# Patient Record
Sex: FEMALE | Race: WHITE | URBAN - METROPOLITAN AREA
[De-identification: names, ages, dates, MRNs, and addresses within clinical notes are randomized per-mention and may not be internally consistent; named-entity substitution may affect disease eponyms.]

---

## 2022-03-25 ENCOUNTER — TELEPHONE (OUTPATIENT)
Dept: ORTHOPEDICS CLINIC | Facility: CLINIC | Age: 27
End: 2022-03-25

## 2022-03-25 NOTE — TELEPHONE ENCOUNTER
Patient has an appointment scheduled with Dr. Duane Cuevas on 4/12/22 for Big toenail on right foot damaged from fall 3 years ago. No longer growing out, just up. Please advise if imaging is needed.

## 2022-04-12 ENCOUNTER — OFFICE VISIT (OUTPATIENT)
Dept: ORTHOPEDICS CLINIC | Facility: CLINIC | Age: 27
End: 2022-04-12
Payer: COMMERCIAL

## 2022-04-12 VITALS — BODY MASS INDEX: 22.22 KG/M2 | HEIGHT: 69 IN | WEIGHT: 150 LBS

## 2022-04-12 DIAGNOSIS — B35.1 ONYCHOMYCOSIS: Primary | ICD-10-CM

## 2022-04-12 PROCEDURE — 3008F BODY MASS INDEX DOCD: CPT | Performed by: PODIATRIST

## 2022-04-12 PROCEDURE — 99202 OFFICE O/P NEW SF 15 MIN: CPT | Performed by: PODIATRIST

## 2022-04-12 RX ORDER — NORETHINDRONE ACETATE AND ETHINYL ESTRADIOL 1MG-20(21)
1 KIT ORAL DAILY
COMMUNITY
Start: 2021-09-29

## 2022-04-29 ENCOUNTER — OFFICE VISIT (OUTPATIENT)
Dept: ORTHOPEDICS CLINIC | Facility: CLINIC | Age: 27
End: 2022-04-29
Payer: COMMERCIAL

## 2022-04-29 VITALS — WEIGHT: 150 LBS | HEIGHT: 69 IN | BODY MASS INDEX: 22.22 KG/M2

## 2022-04-29 DIAGNOSIS — B35.1 ONYCHOMYCOSIS: Primary | ICD-10-CM

## 2022-04-29 DIAGNOSIS — M79.674 PAIN OF TOE OF RIGHT FOOT: ICD-10-CM

## 2022-04-29 PROCEDURE — 87101 SKIN FUNGI CULTURE: CPT | Performed by: PODIATRIST

## 2022-04-29 PROCEDURE — 87107 FUNGI IDENTIFICATION MOLD: CPT | Performed by: PODIATRIST

## 2022-04-29 PROCEDURE — 99213 OFFICE O/P EST LOW 20 MIN: CPT | Performed by: PODIATRIST

## 2022-04-29 PROCEDURE — 11730 AVULSION NAIL PLATE SIMPLE 1: CPT | Performed by: PODIATRIST

## 2022-04-29 PROCEDURE — 3008F BODY MASS INDEX DOCD: CPT | Performed by: PODIATRIST

## 2022-04-29 NOTE — PROGRESS NOTES
[facility] Progress Note    Subjective:       Is here for procedure to the right great toenail she has had chronic problems with the nail and injuries. We had talked about removing the nail, simple avulsion and then as the nail grows back treating with topical Naftin nail gel and if pathology shows fungus, going ahead with oral Lamisil as an option. Objective:     Exam right great toenail lifted distally from the nail bed proximally is well adhered but there are 2 separate nail portions. No signs of infection but she does have discomfort and mild swelling  Neuro vascular status intact                  Assessment/Plan:     Diagnoses and all orders for this visit:    Onychomycosis    Pain of toe of right foot        After sterile prep local anesthesia to the right great toe. Hemostat used to remove the nail without incident. Nail sent to pathology for identification and then may consider oral Lamisil. Nailbed clear and intact. Sterile bandage applied and soaking instructions given. We will let her know the results of the pathology and consider Lamisil oral but definitely start with Naftin gel when the nailbed has healed in 3 to 4 weeks. Soaking instructions and care of the toe given. Follow-up in 3 weeks    Beverly Mahan. Ayaka Garcia DPM  [facility]    [employer] speech recognition software was used to prepare this note. If a word or phrase is confusing, it is likely do to a failure of recognition. Please contact me with any questions or clarifications.

## 2022-05-20 ENCOUNTER — OFFICE VISIT (OUTPATIENT)
Dept: ORTHOPEDICS CLINIC | Facility: CLINIC | Age: 27
End: 2022-05-20
Payer: COMMERCIAL

## 2022-05-20 VITALS — HEIGHT: 69 IN | WEIGHT: 150 LBS | OXYGEN SATURATION: 97 % | BODY MASS INDEX: 22.22 KG/M2 | HEART RATE: 57 BPM

## 2022-05-20 DIAGNOSIS — B35.1 ONYCHOMYCOSIS: Primary | ICD-10-CM

## 2022-05-20 PROCEDURE — 3008F BODY MASS INDEX DOCD: CPT | Performed by: PODIATRIST

## 2022-05-20 PROCEDURE — 99213 OFFICE O/P EST LOW 20 MIN: CPT | Performed by: PODIATRIST

## 2022-05-20 RX ORDER — TERBINAFINE HYDROCHLORIDE 250 MG/1
250 TABLET ORAL DAILY
Qty: 30 TABLET | Refills: 2 | Status: SHIPPED | OUTPATIENT
Start: 2022-05-20 | End: 2022-08-12

## 2022-05-20 RX ORDER — NAFTIFINE HYDROCHLORIDE 2 G/100G
1 GEL TOPICAL DAILY
Qty: 60 G | Refills: 0 | Status: SHIPPED | OUTPATIENT
Start: 2022-05-20

## 2022-05-20 RX ORDER — NAFTIFINE HYDROCHLORIDE 2 G/100G
1 GEL TOPICAL DAILY
Qty: 60 G | Refills: 0 | COMMUNITY
Start: 2022-05-20

## 2022-05-20 NOTE — PROGRESS NOTES
[facility] Progress Note    Subjective:     Mayo Alonzo is here for follow-up now almost 1 month post nail avulsion for fungal nail. She is doing well and has had very little issues with the right great toenail. Originally she had dropped a large box on the toe and the nail had grown back oddly over the year. Objective:     Lab results come back with mold and fungus elements to the toenail. Exam of the right great toenail-post avulsion, nailbed is clean and dry and is not ridged, looks healthy                  Assessment/Plan:     Diagnoses and all orders for this visit:    Onychomycosis    Other orders  -     terbinafine (LAMISIL) 250 MG Oral Tab; Take 1 tablet (250 mg total) by mouth daily. She has done well with a nail avulsion and she is ready to start the topical Naftin nail gel and also oral Lamisil. She is a good candidate for this and we will plan on taking liver enzyme profile in 3 months after her visit. We discussed taking oral Lamisil daily and any potential complications or side effects we would discontinue. Lonnie Hernandez. Mitchell Wright DPM  [facility]    [employer] speech recognition software was used to prepare this note. If a word or phrase is confusing, it is likely do to a failure of recognition. Please contact me with any questions or clarifications.

## (undated) NOTE — LETTER
Post Operative Nail/Wart Instructions    Soaking solution:   Options:  1. Betadine Solution:  2 cupfuls of Betadine to 1 quart of Cold water. (Generic name - Providone Iodine Solution)  2. [place] Powder:  2 packets to 1 quart of warm water   3. Epsom Salt: 1/2 cup of Epsom Salts to 1 quart of cold water. Soak for 10-15 minutes. Soaking Instructions:  Day of procedure:  Soak with the bandage on for at least 15 minutes and then pat the bandage dry. Soak only once today. Days following procedure:  Soak the area twice daily in soak solution listed above and apply medication listed below. Cover the area with a dressing or band-aids. Medication: Apply the following after each soaking  1. Cortisporin Cream (Mix Neosporin 0.5 Hydrocortisone cream in sandwich bag)  2. Neosporin cream  3. Betadine Ointment  4. Cortisporin Drops   General Information:  Drainage after nail procedures is normal and expected. If you have a nail procedure done, we encourage you to pull back on the skin or use a Q-tip to gently massage the skin away from the remaining nail bed. This can be done while soaking or after showering. This will encourage drainage from the procedure area. It is not uncommon for the drainage to become blocked soon after surgery. The area can become swollen and red giving the appearance of an infection. If this happens, increase the time you soak by 5 minutes and use the Q-tip method to help with the drainage. If after a few days this does not improve or worsens call the office immediately to make an appointment to see the physician.     If you have any questions or concerns, please call our office at [phone]